# Patient Record
Sex: FEMALE | Race: BLACK OR AFRICAN AMERICAN | NOT HISPANIC OR LATINO | Employment: FULL TIME | URBAN - METROPOLITAN AREA
[De-identification: names, ages, dates, MRNs, and addresses within clinical notes are randomized per-mention and may not be internally consistent; named-entity substitution may affect disease eponyms.]

---

## 2017-10-19 ENCOUNTER — APPOINTMENT (OUTPATIENT)
Dept: GENERAL RADIOLOGY | Facility: CLINIC | Age: 42
End: 2017-10-19
Attending: EMERGENCY MEDICINE
Payer: COMMERCIAL

## 2017-10-19 ENCOUNTER — HOSPITAL ENCOUNTER (EMERGENCY)
Facility: CLINIC | Age: 42
Discharge: HOME | End: 2017-10-19
Attending: EMERGENCY MEDICINE | Admitting: EMERGENCY MEDICINE
Payer: COMMERCIAL

## 2017-10-19 VITALS
RESPIRATION RATE: 20 BRPM | BODY MASS INDEX: 28.96 KG/M2 | SYSTOLIC BLOOD PRESSURE: 121 MMHG | DIASTOLIC BLOOD PRESSURE: 93 MMHG | OXYGEN SATURATION: 99 % | TEMPERATURE: 97 F | WEIGHT: 174 LBS | HEART RATE: 84 BPM

## 2017-10-19 DIAGNOSIS — M25.562 ARTHRALGIA OF BOTH LOWER LEGS: ICD-10-CM

## 2017-10-19 DIAGNOSIS — M79.672 BILATERAL FOOT PAIN: ICD-10-CM

## 2017-10-19 DIAGNOSIS — M79.671 BILATERAL FOOT PAIN: ICD-10-CM

## 2017-10-19 DIAGNOSIS — M25.561 ARTHRALGIA OF BOTH LOWER LEGS: ICD-10-CM

## 2017-10-19 LAB
ALBUMIN SERPL-MCNC: 3.8 G/DL (ref 3.4–5)
ALBUMIN UR-MCNC: NEGATIVE MG/DL
ALP SERPL-CCNC: 86 U/L (ref 40–150)
ALT SERPL W P-5'-P-CCNC: 20 U/L (ref 0–50)
ANION GAP SERPL CALCULATED.3IONS-SCNC: 5 MMOL/L (ref 3–14)
APPEARANCE UR: CLEAR
AST SERPL W P-5'-P-CCNC: 15 U/L (ref 0–45)
BACTERIA #/AREA URNS HPF: ABNORMAL /HPF
BASOPHILS # BLD AUTO: 0 10E9/L (ref 0–0.2)
BASOPHILS NFR BLD AUTO: 0.2 %
BILIRUB SERPL-MCNC: 0.2 MG/DL (ref 0.2–1.3)
BILIRUB UR QL STRIP: NEGATIVE
BUN SERPL-MCNC: 11 MG/DL (ref 7–30)
CALCIUM SERPL-MCNC: 9.3 MG/DL (ref 8.5–10.1)
CHLORIDE SERPL-SCNC: 107 MMOL/L (ref 94–109)
CO2 SERPL-SCNC: 27 MMOL/L (ref 20–32)
COLOR UR AUTO: YELLOW
CREAT SERPL-MCNC: 0.53 MG/DL (ref 0.52–1.04)
CRP SERPL-MCNC: 12.1 MG/L (ref 0–8)
DIFFERENTIAL METHOD BLD: NORMAL
EOSINOPHIL # BLD AUTO: 0.1 10E9/L (ref 0–0.7)
EOSINOPHIL NFR BLD AUTO: 2 %
ERYTHROCYTE [DISTWIDTH] IN BLOOD BY AUTOMATED COUNT: 13.8 % (ref 10–15)
ERYTHROCYTE [SEDIMENTATION RATE] IN BLOOD BY WESTERGREN METHOD: 21 MM/H (ref 0–20)
GFR SERPL CREATININE-BSD FRML MDRD: >90 ML/MIN/1.7M2
GLUCOSE SERPL-MCNC: 89 MG/DL (ref 70–99)
GLUCOSE UR STRIP-MCNC: NEGATIVE MG/DL
HCT VFR BLD AUTO: 36.8 % (ref 35–47)
HGB BLD-MCNC: 12.8 G/DL (ref 11.7–15.7)
HGB UR QL STRIP: NEGATIVE
IMM GRANULOCYTES # BLD: 0 10E9/L (ref 0–0.4)
IMM GRANULOCYTES NFR BLD: 0.2 %
KETONES UR STRIP-MCNC: NEGATIVE MG/DL
LEUKOCYTE ESTERASE UR QL STRIP: NEGATIVE
LIPASE SERPL-CCNC: 159 U/L (ref 73–393)
LYMPHOCYTES # BLD AUTO: 2.5 10E9/L (ref 0.8–5.3)
LYMPHOCYTES NFR BLD AUTO: 46.4 %
MCH RBC QN AUTO: 30.3 PG (ref 26.5–33)
MCHC RBC AUTO-ENTMCNC: 34.8 G/DL (ref 31.5–36.5)
MCV RBC AUTO: 87 FL (ref 78–100)
MONOCYTES # BLD AUTO: 0.6 10E9/L (ref 0–1.3)
MONOCYTES NFR BLD AUTO: 10.4 %
MUCOUS THREADS #/AREA URNS LPF: PRESENT /LPF
NEUTROPHILS # BLD AUTO: 2.2 10E9/L (ref 1.6–8.3)
NEUTROPHILS NFR BLD AUTO: 40.8 %
NITRATE UR QL: NEGATIVE
NRBC # BLD AUTO: 0 10*3/UL
NRBC BLD AUTO-RTO: 0 /100
PH UR STRIP: 5.5 PH (ref 5–7)
PLATELET # BLD AUTO: 257 10E9/L (ref 150–450)
POTASSIUM SERPL-SCNC: 3.7 MMOL/L (ref 3.4–5.3)
PROT SERPL-MCNC: 7.9 G/DL (ref 6.8–8.8)
RBC # BLD AUTO: 4.23 10E12/L (ref 3.8–5.2)
RBC #/AREA URNS AUTO: 0 /HPF (ref 0–2)
SODIUM SERPL-SCNC: 139 MMOL/L (ref 133–144)
SOURCE: ABNORMAL
SP GR UR STRIP: 1.01 (ref 1–1.03)
SQUAMOUS #/AREA URNS AUTO: 2 /HPF (ref 0–1)
UROBILINOGEN UR STRIP-MCNC: NORMAL MG/DL (ref 0–2)
WBC # BLD AUTO: 5.5 10E9/L (ref 4–11)
WBC #/AREA URNS AUTO: <1 /HPF (ref 0–2)

## 2017-10-19 PROCEDURE — 73630 X-RAY EXAM OF FOOT: CPT | Mod: 50

## 2017-10-19 PROCEDURE — 81001 URINALYSIS AUTO W/SCOPE: CPT | Performed by: EMERGENCY MEDICINE

## 2017-10-19 PROCEDURE — 86038 ANTINUCLEAR ANTIBODIES: CPT | Performed by: EMERGENCY MEDICINE

## 2017-10-19 PROCEDURE — 85652 RBC SED RATE AUTOMATED: CPT | Performed by: EMERGENCY MEDICINE

## 2017-10-19 PROCEDURE — 86431 RHEUMATOID FACTOR QUANT: CPT | Performed by: EMERGENCY MEDICINE

## 2017-10-19 PROCEDURE — 99284 EMERGENCY DEPT VISIT MOD MDM: CPT | Mod: Z6 | Performed by: EMERGENCY MEDICINE

## 2017-10-19 PROCEDURE — 99284 EMERGENCY DEPT VISIT MOD MDM: CPT | Performed by: EMERGENCY MEDICINE

## 2017-10-19 PROCEDURE — 86200 CCP ANTIBODY: CPT | Performed by: EMERGENCY MEDICINE

## 2017-10-19 PROCEDURE — 83690 ASSAY OF LIPASE: CPT | Performed by: EMERGENCY MEDICINE

## 2017-10-19 PROCEDURE — 80053 COMPREHEN METABOLIC PANEL: CPT | Performed by: EMERGENCY MEDICINE

## 2017-10-19 PROCEDURE — 86140 C-REACTIVE PROTEIN: CPT | Performed by: EMERGENCY MEDICINE

## 2017-10-19 PROCEDURE — 85025 COMPLETE CBC W/AUTO DIFF WBC: CPT | Performed by: EMERGENCY MEDICINE

## 2017-10-19 RX ORDER — PREDNISONE 5 MG/1
TABLET ORAL
Qty: 50 TABLET | Refills: 0 | Status: SHIPPED | OUTPATIENT
Start: 2017-10-19 | End: 2018-04-06

## 2017-10-19 ASSESSMENT — ENCOUNTER SYMPTOMS
MYALGIAS: 1
ARTHRALGIAS: 1
DYSURIA: 0

## 2017-10-19 NOTE — ED AVS SNAPSHOT
[facility], Emergency Department    [address]    [place] [address]    Phone:  [phone]    Fax:  [phone]                                       Jena Pak   MRN: [id number]    Department:  [facility], Emergency Department   Date of Visit:  10/19/2017           Patient Information     Date Of Birth          1975        Your diagnoses for this visit were:     Arthralgia of both lower legs        You were seen by Lexx Mcfarlane MD.        Discharge Instructions       Please make an appointment to follow up with Rheumatology clinic at the  as soon as they can get you in.  They will call you for an appointment, but if not call [phone].  Tell them I spoke to Dr. Humphreys.    Return if fever, worse swelling or other problems.      24 Hour Appointment Hotline       To make an appointment at any [facility] clinic, call [phone] ([phone]). If you don't have a family doctor or clinic, we will help you find one. [facility] clinics are conveniently located to serve the needs of you and your family.             Review of your medicines      START taking        Dose / Directions Last dose taken    predniSONE 5 MG tablet   Commonly known as:  DELTASONE   Quantity:  50 tablet        Take 4 tablets all at once in the morning ?5 days, then 3 tablets daily ?5 days, then 2 tablets daily ?5 days, then 1 tablet daily ?5 days.  Take with food   Refills:  0          Our records show that you are taking the medicines listed below. If these are incorrect, please call your family doctor or clinic.        Dose / Directions Last dose taken    cholecalciferol 5000 UNITS Caps   Dose:  1 capsule   Quantity:  100 capsule        Take 1 capsule (5,000 Units) by mouth daily FOR VITAMIN D DEFICIENCY (LOW VITAMIN D),TAKE 2 CAPSULES DAILY FOR THE NEXT 2 WEEKS, THEN 1 CAPSULE DAILY, MUST TAKE WITH WITH FAT CONTAINING MEAL   Refills:  3        cyabnocobalamin 2500 MCG sublingual tablet   Commonly known  as:  VITAMIN B-12   Dose:  2500 mcg   Quantity:  100 tablet        Take 2,500 mcg by mouth every morning INDICATION: FOR VITAMIN B12 SUPPLEMENTATION - TO IMPROVE MEMORY, BALANCE, SLEEP AND MOOD, DIRECTIONS: PLEASE PLACE UNDER THE TONGUE TO DISSOLVE   Refills:  3        ferrous fumarate 65 mg ([place]. FE)-Vitamin C 125 mg  MG Tabs tablet   Commonly known as:  VITRON C   Dose:  1 tablet   Quantity:  60 tablet        Take 1 tablet by mouth 2 times daily (before meals) INDICATION: IRON SUPPLEMENT   Refills:  prn        polyethylene glycol powder   Commonly known as:  MIRALAX   Dose:  1 capful   Quantity:  1 Bottle        Take 17 g (1 capful) by mouth daily INDICATION: CONSTIPATION, TO ACHIEVE 1-2 SOFT BMs PER DAY   Refills:  PRN                Prescriptions were sent or printed at these locations (1 Prescription)                   Other Prescriptions                Printed at Department/Unit printer (1 of 1)         predniSONE (DELTASONE) 5 MG tablet                Procedures and tests performed during your visit     Anti Nuclear Mignon IgG by IFA with Reflex    CBC with platelets differential    CRP inflammation    Comprehensive metabolic panel    Cyclic Citrullinated Peptide Antibody IgG    Erythrocyte sedimentation rate auto    Lipase    Rheumatoid factor    UA with Microscopic    XR Foot Bilateral G/E 3 Views      Orders Needing Specimen Collection     None      Pending Results     Date and Time Order Name Status Description    10/19/2017 1526 Rheumatoid factor In process     10/19/2017 1526 Anti Nuclear Mignon IgG by IFA with Reflex In process     10/19/2017 1526 Cyclic Citrullinated Peptide Antibody IgG In process             Pending Culture Results     No orders found from 10/17/2017 to 10/20/2017.            Pending Results Instructions     If you had any lab results that were not finalized at the time of your Discharge, you can call the ED Lab Result RN at [phone]. You will be contacted by this team for any  "positive Lab results or changes in treatment. The nurses are available 7 days a week from 10A to 6:30P.  You can leave a message 24 hours per day and they will return your call.        Thank you for choosing [facility]       Thank you for choosing [facility] for your care. Our goal is always to provide you with excellent care. Hearing back from our patients is one way we can continue to improve our services. Please take a few minutes to complete the written survey that you may receive in the mail after you visit with us. Thank you!        [employer] Information     [employer] lets you send messages to your doctor, view your test results, renew your prescriptions, schedule appointments and more. To sign up, go to [email or web address] . Click on \"Log in\" on the left side of the screen, which will take you to the Welcome page. Then click on \"Sign up Now\" on the right side of the page.     You will be asked to enter the access code listed below, as well as some personal information. Please follow the directions to create your username and password.     Your access code is: [email or web address]  Expires: 2018  5:55 PM     Your access code will  in 90 days. If you need help or a new code, please call your [facility] clinic or [phone].        Care EveryWhere ID     This is your Care EveryWhere ID. This could be used by other organizations to access your [facility] medical records  [id number]        Equal Access to Services     KEMRA MARTINO : Hadii gualberto Merida, waaxda lugenieadaha, qaybta kaalmada funmilayo, fran matt . So [facility] [phone].    ATENCIÓN: Si habla español, tiene a ulloa disposición servicios gratuitos de asistencia lingüística. Jennifer nicholas [phone].    We comply with applicable federal civil rights laws and [place] laws. We do not discriminate on the basis of race, color, national origin, age, disability, sex, sexual orientation, or gender identity.            After " Visit Summary       This is your record. Keep this with you and show to your community pharmacist(s) and doctor(s) at your next visit.

## 2017-10-19 NOTE — LETTER
To Whom it may concern:      Jena Pak was seen in our Emergency Department today, 10/19/17.  I expect her condition to improve over the next 7 days.  She may return to work/school when improved.    Sincerely,        Lexx Mcfarlane MD

## 2017-10-19 NOTE — DISCHARGE INSTRUCTIONS
Please make an appointment to follow up with Rheumatology clinic at the  as soon as they can get you in.  They will call you for an appointment, but if not call [phone].  Tell them I spoke to Dr. Humphreys.    Return if fever, worse swelling or other problems.

## 2017-10-19 NOTE — ED AVS SNAPSHOT
[facility], [place], Emergency Department    [address]    [facility] [address]    Phone:  [phone]    Fax:  [phone]                                       Jena Pak   MRN: [id number]    Department:  [facility], Emergency Department   Date of Visit:  10/19/2017           After Visit Summary Signature Page     I have received my discharge instructions, and my questions have been answered. I have discussed any challenges I see with this plan with the nurse or doctor.    ..........................................................................................................................................  Patient/Patient Representative Signature      ..........................................................................................................................................  Patient Representative Print Name and Relationship to Patient    ..................................................               ................................................  Date                                            Time    ..........................................................................................................................................  Reviewed by Signature/Title    ...................................................              ..............................................  Date                                                            Time

## 2017-10-19 NOTE — ED PROVIDER NOTES
"    [facility] EMERGENCY DEPARTMENT ([facility])    10/19/17     ED 3  2:48 PM   History     Chief Complaint   Patient presents with     Foot Pain     bilat foot pain, mild swelling on lateral aspect     The history is provided by the patient and medical records. The history is limited by a language barrier (Sammarinese). A  was used ([employer] ).     Jena Pak is a 42 year old female who presents with bilateral foot swelling that started about 7 months ago as well as worsening bilateral foot pain. Patient notes symptoms started with painless bilateral swelling in her legs and feet around 7 months ago. She states she saw her clinic ([facility]) for this. She states they felt there was nothing wrong with her feet. She states that she was told she was calcium deficient and instructed her to take calcium supplements. She also notes recent [facility] visit and was told she had hematuria (per [facility] records they felt she had possible UTI, was prescribed Macrobid empirically). Patient feels dissatisfied with her care at [facility], stating that she is worried something is wrong that they are missing.  She states her bilateral feet became painful and swollen spontaneously over the 3 days. The swelling waxes and wanes. She has pain in her bilateral legs from knees on down, focal in her feet, and this worsens with ambulation. She has some abdominal bloating, with tenderness over the left side. She notes some prior bruising around her  that she had 7 months ago. No history of liver or kidney disease. Patient has not seen ortho for her bilateral lower extremity pain. She states that sometimes her bones \"separate\" in her left knee and is concerned for this. She denies any trauma to her legs. She had a fever Monday night (3 days ago) but none since. No change in urination. 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     [facility]     I have reviewed the Medications, Allergies, Past Medical and Surgical History, and Social History in the Epic system.    Review of Systems   Cardiovascular: Positive for leg swelling.   Genitourinary: Negative for dysuria.   Musculoskeletal: Positive for arthralgias and myalgias.       Physical Exam   BP: 108/63  Pulse: 81  Temp: 97  F (36.1  C)  Resp: 16  Weight: 78.9 kg (174 lb)  SpO2: 100 %      Physical Exam   Constitutional: No distress.   HENT:   Head: Atraumatic.   Mouth/Throat: Oropharynx is clear and moist. No oropharyngeal exudate.   Eyes: Pupils are equal, round, and reactive to light. No scleral icterus.   Neck: Neck supple.   Cardiovascular: Normal heart sounds and intact distal pulses.    No murmur heard.  Pulmonary/Chest: Breath sounds normal. No respiratory distress.   Abdominal: Soft. Bowel sounds are normal. There is no tenderness.   Musculoskeletal: She exhibits no edema.        Right shoulder: She exhibits tenderness. She exhibits normal range of motion.        Feet:    Skin: Skin is warm. No rash noted. She is not diaphoretic.       ED Course     ED Course     Procedures             Critical Care time:  none             Labs Ordered and Resulted from Time of ED Arrival Up to the Time of Departure from the ED   ROUTINE UA WITH MICROSCOPIC - Abnormal; Notable for the following:        Result Value    Bacteria Urine Few (*)     Squamous Epithelial /HPF Urine 2 (*)     Mucous Urine Present (*)     All other components within normal limits   CRP INFLAMMATION - Abnormal; Notable for the following:     CRP Inflammation 12.1 (*)     All other components within normal limits   ERYTHROCYTE SEDIMENTATION RATE AUTO - Abnormal; Notable for the following:     Sed Rate 21 (*)     All other components within normal limits   CBC WITH PLATELETS DIFFERENTIAL   COMPREHENSIVE METABOLIC PANEL   LIPASE   CYCLIC CITRULLINATED PEPTIDE ANTIBODY IGG   ANTI NUCLEAR SCAR IGG BY IFA WITH REFLEX   RHEUMATOID  FACTOR            Assessments & Plan (with Medical Decision Making)   The patient has bilateral foot swelling, redness and tenderness which has been going on for several years intermittently but more prominent over the past week.  There is no clear sign of infection and this is not consistent with gout given the more diffuse multi-joint nature of it.  The patient has seen her doctor but not been satisfied with his recommendation to follow-up with orthopedics.  Her laboratory tests are normal except for a mildly elevated CRP and sed rate.  I spoke with Dr. Humphreys from rheumatology who recommended follow-up in her clinic.  Laboratory tests were added on as well as x-rays to get more background by the time she is seen in clinic.  She recommended a short course of prednisone starting at 20 mg and tapering to 5 every 5 days.  I talked to the patient about this and she was very interested if she has not been able to work due to the pain.  The patient otherwise does not appear systemically ill and will be discharged to home to follow-up with her primary doctor and rheumatology.  She agrees to return or she develops any fever or other systemic symptoms.    I have reviewed the nursing notes.    I have reviewed the findings, diagnosis, plan and need for follow up with the patient.    Discharge Medication List as of 10/19/2017  5:55 PM      START taking these medications    Details   predniSONE (DELTASONE) 5 MG tablet Take 4 tablets all at once in the morning ×5 days, then 3 tablets daily ×5 days, then 2 tablets daily ×5 days, then 1 tablet daily ×5 days.  Take with food, Disp-50 tablet, R-0, Local Print             Final diagnoses:   Arthralgia of both lower legs   ISerena, am serving as a trained medical scribe to document services personally performed by Lexx Mcfarlane MD, based on the provider's statements to me on October 19, 2017.  This document has been checked and approved by the attending  provider.    I, Lexx Mcfarlane MD, was physically present and have reviewed and verified the accuracy of this note documented by Serena Alex, medical scribe.       10/19/2017   [facility], [place], EMERGENCY DEPARTMENT     Lexx Mcfarlane MD  10/19/17 3432

## 2017-10-20 LAB
ANA SER QL IF: NEGATIVE
RHEUMATOID FACT SER NEPH-ACNC: <20 IU/ML (ref 0–20)

## 2017-10-22 LAB — CCP AB SER IA-ACNC: 1 U/ML

## 2017-11-02 ENCOUNTER — TELEPHONE (OUTPATIENT)
Dept: RHEUMATOLOGY | Facility: CLINIC | Age: 42
End: 2017-11-02

## 2017-11-02 NOTE — TELEPHONE ENCOUNTER
General Rheumatology Intake Form    1. What is the reason that you are referred to our clinic? Inflammatory arthritis,     2. What is the name of the doctor and clinic that referred you to us? ER Follow up    3. Have you seen a Rheumatologist in the past?  no    4. Have you been diagnosed with Fibromyalgia? No     4. Who is your primary doctor/primary clinic? [facility]    5. Who manages your care for this issue now? none    6. Have you had any labs/pathology/ imaging done that pertain to the reason that you are coming here? yes If yes, where? [facility] and [facility]      7. Have you seen any specialist related to the reason you are coming here? no     8. Where are we expecting records from? Care [facility] from [facility] Lori Kipka [facility]  11/2/2017 3:04 PM

## 2017-11-02 NOTE — TELEPHONE ENCOUNTER
Called patient's Home number on file [phone] (home) and spoke with patient and offered an appointment on 12/15/2017 with Dr. Collazo. Patient accepted the date and time and denies any questions at this time.An earlier was available, but the patient stated that she did not have a ride and requested that the appointment is scheduled on a Friday. Patient was was instructed to arrive 15 minutes prior to appointment and asked to bring a current medication list. Patient verbalized understanding.    [facility] Everywhere is needed from [facility].     Message has been sent to Clinic Coordinator for assistance with scheduling.  Iwona Grissom CMA  11/2/2017 3:04 PM

## 2017-11-02 NOTE — TELEPHONE ENCOUNTER
----- Message from Manuel Mena RN sent at 10/26/2017  2:55 PM CDT -----  Regarding: FW: new pt      ----- Message -----     From: Modesto Humphreys MD     Sent: 10/20/2017  10:41 AM       To: Manuel Mena RN  Subject: new pt                                           New inflammatory arthritis pt was seen in ER yesterday, MARGE/RF/anti-CCP pending, high ESR/CRP, neg feet x-rays for erosions. Could be seen by Mikhail or fellows within next 4-6 weeks. Was prescribed pred tpaer: 4tab=20 mg qd x 5 days, 3tab=15 mg qd x 5 days, 2tab=10 mg qd x 5 days, 1 tab=5 mg qd x 5 days then stop        Thanks

## 2018-04-06 ENCOUNTER — HOSPITAL ENCOUNTER (EMERGENCY)
Facility: CLINIC | Age: 43
Discharge: HOME | End: 2018-04-06
Attending: FAMILY MEDICINE | Admitting: FAMILY MEDICINE
Payer: MEDICAID

## 2018-04-06 VITALS
OXYGEN SATURATION: 98 % | DIASTOLIC BLOOD PRESSURE: 97 MMHG | TEMPERATURE: 98 F | HEART RATE: 86 BPM | RESPIRATION RATE: 20 BRPM | SYSTOLIC BLOOD PRESSURE: 132 MMHG

## 2018-04-06 DIAGNOSIS — R07.0 THROAT PAIN: ICD-10-CM

## 2018-04-06 LAB
DEPRECATED S PYO AG THROAT QL EIA: NORMAL
SPECIMEN SOURCE: NORMAL

## 2018-04-06 PROCEDURE — 99282 EMERGENCY DEPT VISIT SF MDM: CPT | Mod: Z6 | Performed by: FAMILY MEDICINE

## 2018-04-06 PROCEDURE — 25000132 ZZH RX MED GY IP 250 OP 250 PS 637: Performed by: FAMILY MEDICINE

## 2018-04-06 PROCEDURE — 87081 CULTURE SCREEN ONLY: CPT | Performed by: FAMILY MEDICINE

## 2018-04-06 PROCEDURE — 99283 EMERGENCY DEPT VISIT LOW MDM: CPT | Performed by: FAMILY MEDICINE

## 2018-04-06 PROCEDURE — 87880 STREP A ASSAY W/OPTIC: CPT | Performed by: FAMILY MEDICINE

## 2018-04-06 RX ORDER — ACETAMINOPHEN 500 MG
1000 TABLET ORAL ONCE
Status: COMPLETED | OUTPATIENT
Start: 2018-04-06 | End: 2018-04-06

## 2018-04-06 RX ORDER — IBUPROFEN 200 MG
600 TABLET ORAL EVERY 6 HOURS PRN
Qty: 30 TABLET | Refills: 0 | Status: SHIPPED | OUTPATIENT
Start: 2018-04-06

## 2018-04-06 RX ADMIN — ACETAMINOPHEN 1000 MG: 500 TABLET, FILM COATED ORAL at 19:59

## 2018-04-06 NOTE — ED PROVIDER NOTES
History     Chief Complaint   Patient presents with     Pharyngitis     throat pain, ear pain, hoarse voice started this am     HPI  Jena Pak is a 43 year old female who presents with sore throat, bilateral ear pain, hoarse voice, subjective fever.  Patient states yesterday she had a sore throat and bilateral ear pain.  No runny nose or cough.  Subjective fever.  Today her voice became hoarse.  She has slight pain with swallowing.  No shortness of breath, no cough, no rash, no joint pain or swelling.  No recent travel.  No known contagious exposures.  No significant past medical history she is a non-smoker    I have reviewed the Medications, Allergies, Past Medical and Surgical History, and Social History in the Epic system.    Review of Systems  All other systems were reviewed and are negative    Physical Exam   BP: 129/66  Pulse: 86  Temp: 98  F (36.7  C)  Resp: 16  SpO2: 99 %      Physical Exam   Constitutional: She is oriented to person, place, and time. She appears well-developed and well-nourished.   HENT:   Head: Normocephalic and atraumatic.   Mouth/Throat: Uvula is midline. No trismus in the jaw. Posterior oropharyngeal erythema present. No oropharyngeal exudate, posterior oropharyngeal edema or tonsillar abscesses.   Eyes: EOM are normal. Pupils are equal, round, and reactive to light.   Neck: Trachea normal and normal range of motion. Neck supple. No tracheal deviation present. No thyroid mass and no thyromegaly present.   There is a somewhat hoarse character to the patient's voice.  She is in no respiratory distress.  There is no stridor.   Cardiovascular: Normal rate, regular rhythm, normal heart sounds and intact distal pulses.  Exam reveals no gallop and no friction rub.    No murmur heard.  Pulmonary/Chest: Effort normal and breath sounds normal. She exhibits no tenderness.   Abdominal: Soft. Bowel sounds are normal. She exhibits no distension and no mass. There is no tenderness.    Musculoskeletal: She exhibits no edema or tenderness.   Neurological: She is alert and oriented to person, place, and time. No cranial nerve deficit. Coordination normal.   Skin: Skin is warm and dry. No rash noted.   Psychiatric: She has a normal mood and affect. Her behavior is normal.   Nursing note and vitals reviewed.      ED Course     ED Course     Procedures             Critical Care time:  none             Labs Ordered and Resulted from Time of ED Arrival Up to the Time of Departure from the ED   RAPID STREP SCREEN   BETA STREP GROUP A CULTURE            Assessments & Plan (with Medical Decision Making)   Patient presenting with 2 day history of progressively worsening sore throat, bilateral ear pain, subjective fever, and now hoarse character to the voice.  On exam, vitals are normal.  She is in no distress.  She is having no difficulty swallowing.  There is erythema of the posterior pharynx, but no enlargement of the tonsils, no trismus, uvula midline.  Voice slightly worse but no respiratory difficulty and no stridor.  Lungs clear.  Remainder of exam normal.  Rapid strep is negative.  Etiology of her symptoms appear to be likely viral pharyngitis.  She appears stable to be discharged and to proceed with symptomatic treatment and follow-up as an outpatient.  Discussed expected course, need for follow up, and indications for return with the patient.  See discharge instructions.      I have reviewed the nursing notes.    I have reviewed the findings, diagnosis, plan and need for follow up with the patient.    New Prescriptions    BENZOCAINE-MENTHOL 10-2.1 MG LOZG    Take 1 lozenge by mouth 4 times daily as needed    IBUPROFEN (ADVIL/MOTRIN) 200 MG TABLET    Take 3 tablets (600 mg) by mouth every 6 hours as needed for mild pain       Final diagnoses:   Throat pain       4/6/2018   [facility], [place], EMERGENCY DEPARTMENT     Anant Francis MD  04/06/18 1945

## 2018-04-06 NOTE — ED AVS SNAPSHOT
[facility], [place], Emergency Department    [address]    [facility] [address]    Phone:  [phone]    Fax:  [phone]                                       Jena Pak   MRN: [id number]    Department:  [facility], Emergency Department   Date of Visit:  4/6/2018           After Visit Summary Signature Page     I have received my discharge instructions, and my questions have been answered. I have discussed any challenges I see with this plan with the nurse or doctor.    ..........................................................................................................................................  Patient/Patient Representative Signature      ..........................................................................................................................................  Patient Representative Print Name and Relationship to Patient    ..................................................               ................................................  Date                                            Time    ..........................................................................................................................................  Reviewed by Signature/Title    ...................................................              ..............................................  Date                                                            Time

## 2018-04-06 NOTE — ED AVS SNAPSHOT
[facility], Emergency Department    [address]    [place] [address]    Phone:  [phone]    Fax:  [phone]                                       Jena Pak   MRN: [id number]    Department:  [facility], Emergency Department   Date of Visit:  4/6/2018           Patient Information     Date Of Birth          1975        Your diagnoses for this visit were:     Throat pain        You were seen by Anant Francis MD.        Discharge Instructions       Thank you for choosing [facility].     Please closely monitor for further symptoms. Return to the Emergency Department if you develop any new or worsening signs or symptoms.    If you received any opiate pain medications or sedatives during your visit, please do not drive for at least 8 hours.     Labs, cultures or final xray interpretations may still need to be reviewed.  We will call you if your plan of care needs to be changed.    Please follow up with your primary care physician or clinic.      Discharge References/Attachments     SORE THROATS, SELF-CARE FOR (ENGLISH)    PHARYNGITIS, VIRAL (ENGLISH)      24 Hour Appointment Hotline       To make an appointment at any [place] clinic, call [phone] ([phone]). If you don't have a family doctor or clinic, we will help you find one. [place] clinics are conveniently located to serve the needs of you and your family.             Review of your medicines      START taking        Dose / Directions Last dose taken    Benzocaine-Menthol 10-2.1 MG Lozg   Dose:  1 lozenge   Quantity:  84 lozenge        Take 1 lozenge by mouth 4 times daily as needed   Refills:  0        ibuprofen 200 MG tablet   Commonly known as:  ADVIL/MOTRIN   Dose:  600 mg   Quantity:  30 tablet        Take 3 tablets (600 mg) by mouth every 6 hours as needed for mild pain   Refills:  0          Our records show that you are taking the medicines listed below. If these are incorrect, please  call your family doctor or clinic.        Dose / Directions Last dose taken    cholecalciferol 5000 UNITS Caps   Dose:  1 capsule   Quantity:  100 capsule        Take 1 capsule (5,000 Units) by mouth daily FOR VITAMIN D DEFICIENCY (LOW VITAMIN D),TAKE 2 CAPSULES DAILY FOR THE NEXT 2 WEEKS, THEN 1 CAPSULE DAILY, MUST TAKE WITH WITH FAT CONTAINING MEAL   Refills:  3        cyanocobalamin 2500 MCG sublingual tablet   Commonly known as:  VITAMIN B-12   Dose:  2500 mcg   Quantity:  100 tablet        Take 2,500 mcg by mouth every morning INDICATION: FOR VITAMIN B12 SUPPLEMENTATION - TO IMPROVE MEMORY, BALANCE, SLEEP AND MOOD, DIRECTIONS: PLEASE PLACE UNDER THE TONGUE TO DISSOLVE   Refills:  3        ferrous fumarate 65 mg ([place]. FE)-Vitamin C 125 mg  MG Tabs tablet   Commonly known as:  VITRON C   Dose:  1 tablet   Quantity:  60 tablet        Take 1 tablet by mouth 2 times daily (before meals) INDICATION: IRON SUPPLEMENT   Refills:  prn                Prescriptions were sent or printed at these locations (2 Prescriptions)                   Other Prescriptions                Printed at Department/Unit printer (2 of 2)         ibuprofen (ADVIL/MOTRIN) 200 MG tablet               Benzocaine-Menthol 10-2.1 MG LOZG                Procedures and tests performed during your visit     Beta strep group A culture    Rapid strep screen      Orders Needing Specimen Collection     None      Pending Results     Date and Time Order Name Status Description    4/6/2018 1904 Beta strep group A culture In process             Pending Culture Results     Date and Time Order Name Status Description    4/6/2018 1904 Beta strep group A culture In process             Pending Results Instructions     If you had any lab results that were not finalized at the time of your Discharge, you can call the ED Lab Result RN at [phone]. You will be contacted by this team for any positive Lab results or changes in treatment. The nurses are  "available 7 days a week from 10A to 6:30P.  You can leave a message 24 hours per day and they will return your call.        Thank you for choosing [facility]       Thank you for choosing [facility] for your care. Our goal is always to provide you with excellent care. Hearing back from our patients is one way we can continue to improve our services. Please take a few minutes to complete the written survey that you may receive in the mail after you visit with us. Thank you!        [employer] Information     [email or web address] lets you send messages to your doctor, view your test results, renew your prescriptions, schedule appointments and more. To sign up, go to [email or web address] . Click on \"Log in\" on the left side of the screen, which will take you to the Welcome page. Then click on \"Sign up Now\" on the right side of the page.     You will be asked to enter the access code listed below, as well as some personal information. Please follow the directions to create your username and password.     Your access code is: [email or web address]  Expires: 2018  7:57 PM     Your access code will  in 90 days. If you need help or a new code, please call your [facility] clinic or [phone].        Care EveryWhere ID     This is your Care EveryWhere ID. This could be used by other organizations to access your [facility] medical records  [id number]        Equal Access to Services     KEMAR MARTINO AH: Hadluis felipe Merida, waaxda luqjavon, qaybta kaalfran deleon. So [facility] [phone].    ATENCIÓN: Si habla español, tiene a ulloa disposición servicios gratuitos de asistencia lingüística. Jennifer al [phone].    We comply with applicable federal civil rights laws and [place] laws. We do not discriminate on the basis of race, color, national origin, age, disability, sex, sexual orientation, or gender identity.            After Visit Summary       This is your record. Keep this with you and " show to your community pharmacist(s) and doctor(s) at your next visit.

## 2018-04-07 NOTE — DISCHARGE INSTRUCTIONS
Thank you for choosing [facility].     Please closely monitor for further symptoms. Return to the Emergency Department if you develop any new or worsening signs or symptoms.    If you received any opiate pain medications or sedatives during your visit, please do not drive for at least 8 hours.     Labs, cultures or final xray interpretations may still need to be reviewed.  We will call you if your plan of care needs to be changed.    Please follow up with your primary care physician or clinic.

## 2018-04-08 LAB
BACTERIA SPEC CULT: NORMAL
Lab: NORMAL
SPECIMEN SOURCE: NORMAL

## 2020-01-25 ENCOUNTER — HOSPITAL ENCOUNTER (EMERGENCY)
Facility: CLINIC | Age: 45
Discharge: HOME | End: 2020-01-25
Attending: EMERGENCY MEDICINE | Admitting: EMERGENCY MEDICINE
Payer: COMMERCIAL

## 2020-01-25 ENCOUNTER — APPOINTMENT (OUTPATIENT)
Dept: GENERAL RADIOLOGY | Facility: CLINIC | Age: 45
End: 2020-01-25
Attending: EMERGENCY MEDICINE
Payer: COMMERCIAL

## 2020-01-25 VITALS
SYSTOLIC BLOOD PRESSURE: 128 MMHG | TEMPERATURE: 97.9 F | OXYGEN SATURATION: 100 % | RESPIRATION RATE: 18 BRPM | DIASTOLIC BLOOD PRESSURE: 92 MMHG | HEART RATE: 72 BPM

## 2020-01-25 DIAGNOSIS — S29.011A STRAIN OF RIGHT PECTORALIS MUSCLE, INITIAL ENCOUNTER: ICD-10-CM

## 2020-01-25 PROCEDURE — 99284 EMERGENCY DEPT VISIT MOD MDM: CPT | Mod: 25 | Performed by: EMERGENCY MEDICINE

## 2020-01-25 PROCEDURE — 93005 ELECTROCARDIOGRAM TRACING: CPT | Performed by: EMERGENCY MEDICINE

## 2020-01-25 PROCEDURE — 25000132 ZZH RX MED GY IP 250 OP 250 PS 637: Performed by: EMERGENCY MEDICINE

## 2020-01-25 PROCEDURE — 71046 X-RAY EXAM CHEST 2 VIEWS: CPT

## 2020-01-25 PROCEDURE — 99283 EMERGENCY DEPT VISIT LOW MDM: CPT | Mod: Z6 | Performed by: EMERGENCY MEDICINE

## 2020-01-25 RX ORDER — NAPROXEN 500 MG/1
500 TABLET ORAL ONCE
Status: COMPLETED | OUTPATIENT
Start: 2020-01-25 | End: 2020-01-25

## 2020-01-25 RX ORDER — NAPROXEN 500 MG/1
500 TABLET ORAL 2 TIMES DAILY WITH MEALS
Qty: 24 TABLET | Refills: 0 | Status: SHIPPED | OUTPATIENT
Start: 2020-01-25 | End: 2020-02-02

## 2020-01-25 RX ADMIN — NAPROXEN 500 MG: 500 TABLET ORAL at 19:39

## 2020-01-25 NOTE — ED AVS SNAPSHOT
[facility], [place], Emergency Department  [address]  [facility] [address]  Phone:  [phone]  Fax:  [phone]                                    Jena Pak   MRN: [id number]    Department:  [facility], Emergency Department   Date of Visit:  1/25/2020           After Visit Summary Signature Page    I have received my discharge instructions, and my questions have been answered. I have discussed any challenges I see with this plan with the nurse or doctor.    ..........................................................................................................................................  Patient/Patient Representative Signature      ..........................................................................................................................................  Patient Representative Print Name and Relationship to Patient    ..................................................               ................................................  Date                                   Time    ..........................................................................................................................................  Reviewed by Signature/Title    ...................................................              ..............................................  Date                                               Time          22EPIC Rev 08/18

## 2020-01-26 LAB — INTERPRETATION ECG - MUSE: NORMAL

## 2020-01-26 NOTE — ED PROVIDER NOTES
[facility] EMERGENCY DEPARTMENT ([facility])     2020  History     Chief Complaint   Patient presents with     Chest Wall Pain     The history is provided by the patient.     Jena Pak is a 45 year old female who presents to the Emergency Department for complaints of chest wall pain.  Patient complains of symptoms that has been ongoing for the past 3 months.  Patient reports of taking Tylenol for pain control though with no improvement.  Denies taking any other medication for this.  Per patient, she has seen a physical therapist for this as well and noting to be in more pain after each session and not improving.    History reviewed. No pertinent past medical history.    Past Surgical History:   Procedure Laterality Date     GYN SURGERY           Family History   Family history unknown: Yes     Social History     Tobacco Use     Smoking status: Never Smoker     Smokeless tobacco: Never Used   Substance Use Topics     Alcohol use: No     No current facility-administered medications for this encounter.      Current Outpatient Medications   Medication     naproxen (NAPROSYN) 500 MG tablet     Benzocaine-Menthol 10-2.1 MG LOZG     cholecalciferol 5000 UNITS CAPS     Cyanocobalamin (VITAMIN  B-12) 2500 MCG tablet     ferrous fumarate 65 mg, [place]. FE,-Vitamin C 125 mg (VITRON C)  MG TABS     ibuprofen (ADVIL/MOTRIN) 200 MG tablet      No Known Allergies    I have reviewed the Medications, Allergies, Past Medical and Surgical History, and Social History in the Epic system.    Review of Systems   All other systems reviewed and are negative.      Physical Exam   BP: (!) 128/92  Pulse: 72  Temp: 97.9  F (36.6  C)  Resp: 18  SpO2: 100 %      Physical Exam  Constitutional:       General: She is not in acute distress.     Appearance: She is well-developed. She is not diaphoretic.      Comments: Comfortably resting, lying in bed, NAD, nondiaphoretic, lucid, fully conversant, no  respiratory  distress, alert and oriented.     HENT:      Head: Normocephalic and atraumatic.      Mouth/Throat:      Pharynx: No oropharyngeal exudate.   Eyes:      General: No scleral icterus.     Pupils: Pupils are equal, round, and reactive to light.   Neck:      Musculoskeletal: Normal range of motion and neck supple.   Cardiovascular:      Rate and Rhythm: Normal rate.      Heart sounds: Normal heart sounds.   Pulmonary:      Effort: Pulmonary effort is normal. No respiratory distress.      Breath sounds: Normal breath sounds.   Musculoskeletal:         General: No tenderness.        Arms:    Skin:     General: Skin is warm and dry.      Coloration: Skin is not pale.      Findings: No erythema or rash.   Neurological:      Mental Status: She is alert and oriented to person, place, and time.         ED Course   7:15 PM  The patient was seen and examined by Carmine Medina MD,  in Room ED08.      Procedures             Critical Care time:  none     Results for orders placed or performed during the hospital encounter of 01/25/20   Chest XR,  PA & LAT     Status: None    Narrative    CHEST TWO VIEW   1/25/2020 8:15 PM     HISTORY: Right-sided chest pain.    COMPARISON: None.      Impression    IMPRESSION: PA and lateral views of the chest. Lungs are clear. Heart  is normal in size. No effusions are evident. No pneumothorax. Ribs  appear intact where visualized.    NAHOMI ALMAGUER MD               Labs Ordered and Resulted from Time of ED Arrival Up to the Time of Departure from the ED - No data to display       I have reviewed the patient's prior chart including recent labs, ER visits, and available inpatient discharge summaries if available.    Assessments & Plan (with Medical Decision Making)   This is a 45-year-old female patient coming into the emergency room with right pectoral discomfort for the past 3 months.  Patient has sought primary care provider for this and has been going to physical therapy.  She continues to have  "discomfort in this right pectoral region.  On physical exam she has discomfort on palpation and worsening discomfort on pushing motion and extension of the shoulder.  X-rays are otherwise unremarkable.  Due to the fact that this been going on for significant period time and her symptoms are aggravated by activation of the pectoral muscle I believe that her symptoms are most likely associated muscle skeletal issues.  She was provided with naproxen and had complete resolution of her symptoms.  At this time believe she can be safely discharged and follow-up with her primary care provider.    Pt was discharged home/self-care.  PT was provided written discharge instructions. Additionally verbal instructions were given and discussed with patient.  PT was asked to return to the ED immediately for any new or concerning symptoms.  Pt was in agreement, endorsed understanding, and questions were answered.     I have reviewed the nursing notes.    I have reviewed the findings, diagnosis, plan and need for follow up with the patient.    Discharge Medication List as of 1/25/2020  8:42 PM      START taking these medications    Details   naproxen (NAPROSYN) 500 MG tablet Take 1 tablet (500 mg) by mouth 2 times daily (with meals) for 8 days, Disp-24 tablet, R-0, Local Print             Final diagnoses:   Strain of right pectoralis muscle, initial encounter   ICha, am serving as a trained medical scribe to document services personally performed by Carmine Medina MD, based on the provider's statements to me.      Carmine MILLER MD, was physically present and have reviewed and verified the accuracy of this note documented by Cha Iraheta.       \"This dictation was performed with the assistance of voice recognition software and may contain inadvertant transcription  errors,  omissions and/or  inadvertent word substitution.\" --Carmine Medina MD     1/25/2020   [facility], [place], EMERGENCY DEPARTMENT     Carmine Medina, " MD  01/25/20 2152

## 2020-01-26 NOTE — ED NOTES
"Pt states she fell about a month ago on the ice and injured her right knee.  Pt states she fell on her right side and has been feeling pain since then.  Pt states she has right side chest pain and feels \"achy or discomfort\" through her right shoulder and neck area, Pt gestures with her hand down her right side abd and right leg down to her knee.  Pt states she has not taken any OTC pain meds.  "

## 2023-05-24 ENCOUNTER — APPOINTMENT (OUTPATIENT)
Dept: GENERAL RADIOLOGY | Facility: CLINIC | Age: 48
End: 2023-05-24
Attending: INTERNAL MEDICINE
Payer: COMMERCIAL

## 2023-05-24 ENCOUNTER — HOSPITAL ENCOUNTER (EMERGENCY)
Facility: CLINIC | Age: 48
Discharge: HOME | End: 2023-05-24
Attending: INTERNAL MEDICINE | Admitting: INTERNAL MEDICINE
Payer: COMMERCIAL

## 2023-05-24 VITALS
WEIGHT: 173 LBS | HEART RATE: 82 BPM | BODY MASS INDEX: 32.66 KG/M2 | DIASTOLIC BLOOD PRESSURE: 77 MMHG | TEMPERATURE: 98.7 F | SYSTOLIC BLOOD PRESSURE: 131 MMHG | HEIGHT: 61 IN | OXYGEN SATURATION: 100 % | RESPIRATION RATE: 16 BRPM

## 2023-05-24 DIAGNOSIS — M25.561 PAIN IN BOTH KNEES, UNSPECIFIED CHRONICITY: ICD-10-CM

## 2023-05-24 DIAGNOSIS — M25.562 PAIN IN BOTH KNEES, UNSPECIFIED CHRONICITY: ICD-10-CM

## 2023-05-24 PROCEDURE — 73562 X-RAY EXAM OF KNEE 3: CPT | Mod: RT

## 2023-05-24 PROCEDURE — 99284 EMERGENCY DEPT VISIT MOD MDM: CPT | Performed by: INTERNAL MEDICINE

## 2023-05-24 RX ORDER — NAPROXEN 500 MG/1
500 TABLET ORAL 2 TIMES DAILY PRN
Qty: 30 TABLET | Refills: 0 | Status: SHIPPED | OUTPATIENT
Start: 2023-05-24

## 2023-05-24 ASSESSMENT — ACTIVITIES OF DAILY LIVING (ADL): ADLS_ACUITY_SCORE: 35

## 2023-05-24 NOTE — ED TRIAGE NOTES
Knee Pain Bilateral knee pain from falling on her knees repeatedly. Pt has had multiple falls since March due to slippery surfaces.          Triage Assessment     Row Name 05/24/23 1382       Triage Assessment (Adult)    Airway WDL WDL       Respiratory WDL    Respiratory WDL WDL       Skin Circulation/Temperature WDL    Skin Circulation/Temperature WDL WDL       Cardiac WDL    Cardiac WDL WDL       Peripheral/Neurovascular WDL    Peripheral Neurovascular WDL WDL       Cognitive/Neuro/Behavioral WDL    Cognitive/Neuro/Behavioral WDL WDL

## 2023-05-24 NOTE — LETTER
05/24/23      To Whom it may concern:    Michaela Guillen was in our Emergency Department today, 05/24/23. with a patient who needed his assistance.  Please excuse them from work/school.      Sincerely,    Willy Limon MD

## 2023-05-24 NOTE — ED PROVIDER NOTES
"ED Provider Note  [facility]      History     Chief Complaint   Patient presents with     Knee Pain     Bilateral knee pain from falling on her knees repeatedly. Pt has had multiple falls since March due to slippery surfaces.     HPI  Jena Pak is a 48 year old female who presents with bilateral knee pain. She fell on her knees in 2023. No additional injuries. But also reports that she has some pain in lower legs as well. She does admits she pray 5 x a day with knees bend on the floor.    Past Medical History  History reviewed. No pertinent past medical history.  Past Surgical History:   Procedure Laterality Date     GYN SURGERY           cholecalciferol 5000 UNITS CAPS  Cyanocobalamin (VITAMIN  B-12) 2500 MCG tablet  ferrous fumarate 65 mg, [place]. FE,-Vitamin C 125 mg (VITRON C)  MG TABS  naproxen (NAPROSYN) 500 MG tablet  Benzocaine-Menthol 10-2.1 MG LOZG  ibuprofen (ADVIL/MOTRIN) 200 MG tablet      No Known Allergies  Family History  Family History   Family history unknown: Yes     Social History   Social History     Tobacco Use     Smoking status: Never     Smokeless tobacco: Never   Substance Use Topics     Alcohol use: No     Drug use: No         A medically appropriate review of systems was performed with pertinent positives and negatives noted in the HPI, and all other systems negative.    Physical Exam   BP: 111/78  Pulse: 88  Temp: 98.7  F (37.1  C)  Resp: 14  Height: 154.9 cm (5' 1\")  Weight: 78.5 kg (173 lb)  SpO2: 98 %  Physical Exam  Constitutional:       General: She is not in acute distress.     Appearance: She is not diaphoretic.   HENT:      Head: Atraumatic.      Mouth/Throat:      Pharynx: No oropharyngeal exudate.   Eyes:      General: No scleral icterus.     Pupils: Pupils are equal, round, and reactive to light.   Cardiovascular:      Rate and Rhythm: Regular rhythm.      Heart sounds: Normal heart sounds.   Pulmonary:      Effort: No " respiratory distress.      Breath sounds: Normal breath sounds.   Chest:      Chest wall: No tenderness.   Abdominal:      General: Bowel sounds are normal.      Palpations: Abdomen is soft.      Tenderness: There is no abdominal tenderness.   Musculoskeletal:      Cervical back: No tenderness.      Thoracic back: No tenderness.      Lumbar back: No tenderness.      Right knee: No swelling, deformity, effusion, erythema, ecchymosis, lacerations, bony tenderness or crepitus. Normal range of motion. Tenderness present over the patellar tendon. No LCL laxity, MCL laxity, ACL laxity or PCL laxity. Normal alignment, normal meniscus and normal patellar mobility. Normal pulse.      Left knee: No swelling, deformity, effusion, erythema, ecchymosis, lacerations, bony tenderness or crepitus. Normal range of motion. Tenderness present over the patellar tendon. No LCL laxity, MCL laxity, ACL laxity or PCL laxity.Normal alignment, normal meniscus and normal patellar mobility. Normal pulse.        Legs:    Skin:     General: Skin is warm.      Findings: No rash.   Neurological:      Mental Status: She is oriented to person, place, and time.           ED Course, Procedures, & Data      Procedures                Results for orders placed or performed during the hospital encounter of 05/24/23   XR Knee Left 3 Views     Status: None    Narrative    EXAM: XR KNEE RIGHT 3 VIEWS, XR KNEE LEFT 3 VIEWS  LOCATION: [facility]  DATE/TIME: 5/24/2023 6:44 PM CDT    INDICATION: pain  COMPARISON: None.      Impression    IMPRESSION: Both knees are negative for fracture. Slight osteophytic spurring medial joint line. No significant effusion on either side. Mild degenerative change within the patellofemoral compartment bilaterally.   XR Knee Right 3 Views     Status: None    Narrative    EXAM: XR KNEE RIGHT 3 VIEWS, XR KNEE LEFT 3 VIEWS  LOCATION: [facility]  CENTER  DATE/TIME: 5/24/2023 6:44 PM CDT    INDICATION: pain  COMPARISON: None.      Impression    IMPRESSION: Both knees are negative for fracture. Slight osteophytic spurring medial joint line. No significant effusion on either side. Mild degenerative change within the patellofemoral compartment bilaterally.     Medications - No data to display  Labs Ordered and Resulted from Time of ED Arrival to Time of ED Departure - No data to display  XR Knee Left 3 Views   Final Result   IMPRESSION: Both knees are negative for fracture. Slight osteophytic spurring medial joint line. No significant effusion on either side. Mild degenerative change within the patellofemoral compartment bilaterally.      XR Knee Right 3 Views   Final Result   IMPRESSION: Both knees are negative for fracture. Slight osteophytic spurring medial joint line. No significant effusion on either side. Mild degenerative change within the patellofemoral compartment bilaterally.             Critical care was not performed.     Medical Decision Making  The patient's presentation was of low complexity (an acute and uncomplicated illness or injury).    The patient's evaluation involved:  ordering and/or review of 1 test(s) in this encounter (see separate area of note for details)    The patient's management necessitated moderate risk (prescription drug management including medications given in the ED).      Assessment & Plan    Bilateral knee pain over patellar tendon suggestive for patellar tendinitis possibly from praying with knees down, discharge with naproxen prn but also to suggested to pray from the chair if possible, follow up with her PMD in one week.    I have reviewed the nursing notes. I have reviewed the findings, diagnosis, plan and need for follow up with the patient.    Discharge Medication List as of 5/24/2023  7:38 PM      START taking these medications    Details   naproxen (NAPROSYN) 500 MG tablet Take 1 tablet (500 mg) by mouth 2 times  daily as needed for moderate pain, Disp-30 tablet, R-0, Local Print             Final diagnoses:   Pain in both knees, unspecified chronicity       Willy Limon Md  [facility] EMERGENCY DEPARTMENT  5/24/2023     Willy Limon MD  05/25/23 0033

## 2023-05-25 NOTE — DISCHARGE INSTRUCTIONS
Please try to pray from chair and make an appointment to follow up with Your Primary Care Provider in 5-10 days if not improving.

## 2023-07-31 ENCOUNTER — HOSPITAL ENCOUNTER (EMERGENCY)
Facility: CLINIC | Age: 48
Discharge: HOME | End: 2023-07-31
Attending: EMERGENCY MEDICINE | Admitting: EMERGENCY MEDICINE
Payer: COMMERCIAL

## 2023-07-31 ENCOUNTER — APPOINTMENT (OUTPATIENT)
Dept: GENERAL RADIOLOGY | Facility: CLINIC | Age: 48
End: 2023-07-31
Attending: EMERGENCY MEDICINE
Payer: COMMERCIAL

## 2023-07-31 VITALS
WEIGHT: 177 LBS | HEIGHT: 62 IN | DIASTOLIC BLOOD PRESSURE: 74 MMHG | BODY MASS INDEX: 32.57 KG/M2 | TEMPERATURE: 97.3 F | OXYGEN SATURATION: 96 % | RESPIRATION RATE: 16 BRPM | HEART RATE: 95 BPM | SYSTOLIC BLOOD PRESSURE: 110 MMHG

## 2023-07-31 DIAGNOSIS — J02.0 STREP PHARYNGITIS: ICD-10-CM

## 2023-07-31 DIAGNOSIS — S93.402A SPRAIN OF LEFT ANKLE, UNSPECIFIED LIGAMENT, INITIAL ENCOUNTER: ICD-10-CM

## 2023-07-31 LAB
DEPRECATED S PYO AG THROAT QL EIA: POSITIVE
FLUAV RNA SPEC QL NAA+PROBE: NEGATIVE
FLUBV RNA RESP QL NAA+PROBE: NEGATIVE
RSV RNA SPEC NAA+PROBE: NEGATIVE
SARS-COV-2 RNA RESP QL NAA+PROBE: NEGATIVE

## 2023-07-31 PROCEDURE — 87880 STREP A ASSAY W/OPTIC: CPT | Performed by: EMERGENCY MEDICINE

## 2023-07-31 PROCEDURE — 72170 X-RAY EXAM OF PELVIS: CPT

## 2023-07-31 PROCEDURE — 87637 SARSCOV2&INF A&B&RSV AMP PRB: CPT | Performed by: EMERGENCY MEDICINE

## 2023-07-31 PROCEDURE — 73590 X-RAY EXAM OF LOWER LEG: CPT | Mod: LT

## 2023-07-31 PROCEDURE — 99284 EMERGENCY DEPT VISIT MOD MDM: CPT | Performed by: EMERGENCY MEDICINE

## 2023-07-31 PROCEDURE — 73610 X-RAY EXAM OF ANKLE: CPT | Mod: LT

## 2023-07-31 RX ORDER — PENICILLIN V POTASSIUM 500 MG/1
500 TABLET, FILM COATED ORAL 2 TIMES DAILY
Qty: 20 TABLET | Refills: 0 | Status: SHIPPED | OUTPATIENT
Start: 2023-07-31 | End: 2023-08-10

## 2023-07-31 ASSESSMENT — ACTIVITIES OF DAILY LIVING (ADL)
ADLS_ACUITY_SCORE: 33
ADLS_ACUITY_SCORE: 35

## 2023-07-31 NOTE — ED PROVIDER NOTES
"ED Provider Note  [facility]      History     Chief Complaint   Patient presents with    Pharyngitis    Fall     HPI  Jena Pak is a 48 year old female who presents to the emergency department complaining of sore throat, fever and swollen glands for the past 3 days.  She also reports that she stumbled when walking down a few cement steps a few days ago and injured her left ankle and lower leg.  She denies cough or known sick exposures.  She denies any decreased sensation distally in her foot and has been able to bear weight on the affected extremity.    Past Medical History  No past medical history on file.  Past Surgical History:   Procedure Laterality Date    GYN SURGERY           Benzocaine-Menthol 10-2.1 MG LOZG  cholecalciferol 5000 UNITS CAPS  Cyanocobalamin (VITAMIN  B-12) 2500 MCG tablet  ferrous fumarate 65 mg, [place]. FE,-Vitamin C 125 mg (VITRON C)  MG TABS  penicillin V (VEETID) 500 MG tablet  ibuprofen (ADVIL/MOTRIN) 200 MG tablet  naproxen (NAPROSYN) 500 MG tablet      No Known Allergies  Family History  Family History   Family history unknown: Yes     Social History   Social History     Tobacco Use    Smoking status: Never    Smokeless tobacco: Never   Substance Use Topics    Alcohol use: No    Drug use: No      Past medical history, past surgical history, medications, allergies, family history, and social history were reviewed with the patient. No additional pertinent items.      A medically appropriate review of systems was performed with pertinent positives and negatives noted in the HPI, and all other systems negative.    Physical Exam   BP: 114/77  Pulse: 113  Temp: 97.3  F (36.3  C)  Resp: 16  Height: 157.5 cm (5' 2\")  Weight: 81.6 kg (180 lb)  SpO2: 99 %  Physical Exam  Vitals and nursing note reviewed.   Constitutional:       General: She is not in acute distress.     Appearance: She is well-developed. She is not ill-appearing or toxic-appearing. "   HENT:      Head: Normocephalic and atraumatic.      Mouth/Throat:      Mouth: Mucous membranes are moist.      Pharynx: Uvula midline. Posterior oropharyngeal erythema present. No oropharyngeal exudate or uvula swelling.      Tonsils: No tonsillar exudate.   Eyes:      General: No scleral icterus.     Pupils: Pupils are equal, round, and reactive to light.   Cardiovascular:      Rate and Rhythm: Normal rate and regular rhythm.      Comments: Heart rate approximately 90 minutes  Pulmonary:      Effort: Pulmonary effort is normal. No respiratory distress.   Musculoskeletal:      Cervical back: Normal range of motion and neck supple.      Comments: Ecchymosis, swelling and mild tenderness around area of anterior talofibular ligament and lateral malleolus on the left.  Palpable dorsalis pedis pulses noted.  Sensation is intact distally.  Patient has some mild bruising noted anterior upper tibial area without significant tenderness to palpation of proximal fibula.   Lymphadenopathy:      Cervical: Cervical adenopathy present.   Skin:     General: Skin is warm and dry.      Coloration: Skin is not pale.      Findings: No rash.   Neurological:      Mental Status: She is alert and oriented to person, place, and time.      Sensory: No sensory deficit.   Psychiatric:         Behavior: Behavior normal.           ED Course, Procedures, & Data      Procedures                      Results for orders placed or performed during the hospital encounter of 07/31/23   XR Ankle Left 3 Views     Status: None    Narrative    XR ANKLE LEFT G/E 3 VIEWS 7/31/2023 12:48 PM    HISTORY: Left ankle pain.    COMPARISON: None.      Impression    IMPRESSION: No fracture. The ankle mortise is intact. No osteochondral  injury. No soft tissue swelling.    TRIXIE VALLEJO MD         SYSTEM ID:  [id number]   XR Tibia & Fibula Left 2 Views     Status: None    Narrative    XR TIBIA AND FIBULA LEFT 2 VIEWS 7/31/2023 12:46 PM    HISTORY: Left leg  pain.    COMPARISON: None.      Impression    IMPRESSION: No fracture. Mild degenerative changes in the medial  compartment of the left knee.    TRIXIE VALLEJO MD         SYSTEM ID:  [id number]   XR Pelvis 1/2 Views     Status: None    Narrative    XR PELVIS 1/2 VIEWS 7/31/2023 12:45 PM    HISTORY: Pelvic pain.    COMPARISON: None.      Impression    IMPRESSION: No fracture or degenerative changes.    TRIXIE VALLEJO MD         SYSTEM ID:  [id number]   Symptomatic Influenza A/B, RSV, & SARS-CoV2 PCR (COVID-19) Nasopharyngeal     Status: Normal    Specimen: Nasopharyngeal; Swab   Result Value Ref Range    Influenza A PCR Negative Negative    Influenza B PCR Negative Negative    RSV PCR Negative Negative    SARS CoV2 PCR Negative Negative    Narrative    Testing was performed using the Xpert Xpress CoV2/Flu/RSV Assay on the [employer]. This test should be ordered for the detection of SARS-CoV-2, influenza, and RSV viruses in individuals who meet clinical and/or epidemiological criteria. Test performance is unknown in asymptomatic patients. This test is for in vitro diagnostic use under the FDA EUA for laboratories certified under CLIA to perform high or moderate complexity testing. This test has not been FDA cleared or approved. A negative result does not rule out the presence of PCR inhibitors in the specimen or target RNA in concentration below the limit of detection for the assay. If only one viral target is positive but coinfection with multiple targets is suspected, the sample should be re-tested with another FDA cleared, approved, or authorized test, if coinfection would change clinical management. This test was validated by the [facility] [employer]. These laboratories are certified under the Clinical Laboratory Improvement Amendments of 1988 (CLIA-88) as qualified to perform high complexity laboratory testing.   Streptococcus A Rapid Screen w/Reflex to PCR     Status:  Abnormal    Specimen: Throat; Swab   Result Value Ref Range    Group A Strep antigen Positive (A) Negative     Medications - No data to display  Labs Ordered and Resulted from Time of ED Arrival to Time of ED Departure   STREPTOCOCCUS A RAPID SCREEN W REFELX TO PCR - Abnormal       Result Value    Group A Strep antigen Positive (*)    INFLUENZA A/B, RSV, & SARS-COV2 PCR - Normal    Influenza A PCR Negative      Influenza B PCR Negative      RSV PCR Negative      SARS CoV2 PCR Negative       XR Ankle Left 3 Views   Final Result   IMPRESSION: No fracture. The ankle mortise is intact. No osteochondral   injury. No soft tissue swelling.      TRIXIE VALLEJO MD            SYSTEM ID:  [id number]      XR Tibia & Fibula Left 2 Views   Final Result   IMPRESSION: No fracture. Mild degenerative changes in the medial   compartment of the left knee.      TRIXIE VALLEJO MD            SYSTEM ID:  [id number]      XR Pelvis 1/2 Views   Final Result   IMPRESSION: No fracture or degenerative changes.      TRIXIE VALLEJO MD            SYSTEM ID:  [id number]                 Assessment & Plan      This patient presented to the emergency department with complaints of sore throat, fever as well as ankle pain.  With regards to her ankle pain, x-rays of the left ankle and tib-fib demonstrate no evidence of fracture or dislocation.  She is neurovascularly intact.  I suspect sprain.  Patient was provided with instructions for care and follow-up for this condition.  With regards to the patient's complaints of sore throat and fever, strep testing is positive.  She will be treated with penicillin.  She was discharged in good condition.    I have reviewed the nursing notes. I have reviewed the findings, diagnosis, plan and need for follow up with the patient.    Discharge Medication List as of 7/31/2023  1:24 PM        START taking these medications    Details   penicillin V (VEETID) 500 MG tablet Take 1 tablet (500 mg) by mouth 2  times daily for 10 days, Disp-20 tablet, R-0, E-Prescribe             Final diagnoses:   Strep pharyngitis   Sprain of left ankle, unspecified ligament, initial encounter       Mitchell Rucker MD  [facility] EMERGENCY DEPARTMENT  7/31/2023     Mitchell Rucker MD  08/07/23 [id number]

## 2023-07-31 NOTE — DISCHARGE INSTRUCTIONS
Follow-up with your primary clinic as needed.    Penicillin as directed.    Tylenol and/or ibuprofen for pain.    Ice to areas of swelling and pain on left ankle and leg.    Return to the emergency department for any problems.